# Patient Record
Sex: FEMALE | Race: WHITE
[De-identification: names, ages, dates, MRNs, and addresses within clinical notes are randomized per-mention and may not be internally consistent; named-entity substitution may affect disease eponyms.]

---

## 2021-12-10 ENCOUNTER — HOSPITAL ENCOUNTER (EMERGENCY)
Dept: HOSPITAL 12 - ER | Age: 31
Discharge: HOME | End: 2021-12-10
Payer: MEDICAID

## 2021-12-10 VITALS — HEIGHT: 68 IN | WEIGHT: 185 LBS | BODY MASS INDEX: 28.04 KG/M2

## 2021-12-10 VITALS — SYSTOLIC BLOOD PRESSURE: 103 MMHG | DIASTOLIC BLOOD PRESSURE: 66 MMHG

## 2021-12-10 DIAGNOSIS — F17.210: ICD-10-CM

## 2021-12-10 DIAGNOSIS — F15.10: ICD-10-CM

## 2021-12-10 DIAGNOSIS — L03.115: Primary | ICD-10-CM

## 2021-12-10 LAB
ALP SERPL-CCNC: 125 U/L (ref 50–136)
ALT SERPL W/O P-5'-P-CCNC: 31 U/L (ref 14–59)
AST SERPL-CCNC: 19 U/L (ref 15–37)
BILIRUB DIRECT SERPL-MCNC: 0.1 MG/DL (ref 0–0.2)
BILIRUB SERPL-MCNC: 0.4 MG/DL (ref 0.2–1)
BUN SERPL-MCNC: 13 MG/DL (ref 7–18)
CHLORIDE SERPL-SCNC: 98 MMOL/L (ref 98–107)
CO2 SERPL-SCNC: 30 MMOL/L (ref 21–32)
CREAT SERPL-MCNC: 0.6 MG/DL (ref 0.6–1.3)
GLUCOSE SERPL-MCNC: 107 MG/DL (ref 74–106)
HCT VFR BLD AUTO: 30.3 % (ref 31.2–41.9)
MCH RBC QN AUTO: 29.6 UUG (ref 24.7–32.8)
MCV RBC AUTO: 88.2 FL (ref 75.5–95.3)
PLATELET # BLD AUTO: 347 K/UL (ref 179–408)
POTASSIUM SERPL-SCNC: 3.6 MMOL/L (ref 3.5–5.1)
WS STN SPEC: 8.5 G/DL (ref 6.4–8.2)

## 2021-12-10 PROCEDURE — 84702 CHORIONIC GONADOTROPIN TEST: CPT

## 2021-12-10 PROCEDURE — 93971 EXTREMITY STUDY: CPT

## 2021-12-10 PROCEDURE — 80048 BASIC METABOLIC PNL TOTAL CA: CPT

## 2021-12-10 PROCEDURE — A4663 DIALYSIS BLOOD PRESSURE CUFF: HCPCS

## 2021-12-10 PROCEDURE — 85025 COMPLETE CBC W/AUTO DIFF WBC: CPT

## 2021-12-10 PROCEDURE — 80076 HEPATIC FUNCTION PANEL: CPT

## 2021-12-10 PROCEDURE — 83605 ASSAY OF LACTIC ACID: CPT

## 2021-12-10 PROCEDURE — 96365 THER/PROPH/DIAG IV INF INIT: CPT

## 2021-12-10 PROCEDURE — 99284 EMERGENCY DEPT VISIT MOD MDM: CPT

## 2021-12-10 PROCEDURE — 36415 COLL VENOUS BLD VENIPUNCTURE: CPT

## 2021-12-10 RX ADMIN — MORPHINE SULFATE ONE MG: 4 INJECTION, SOLUTION INTRAMUSCULAR; INTRAVENOUS at 06:34

## 2021-12-10 RX ADMIN — MORPHINE SULFATE ONE MG: 4 INJECTION, SOLUTION INTRAMUSCULAR; INTRAVENOUS at 07:48

## 2021-12-10 NOTE — NUR
Assumed care of patient from day shift nurse. Receieved patient lying in bed. 
Asleep at this time. Appears comfortable. VS WNL.

## 2021-12-10 NOTE — NUR
[employer] WAS NOTIFIED. HE IS ON HIS WAY BUT STUCK IN TRAFFIC. [facility]

## 2021-12-10 NOTE — NUR
Patient A/Ox4, patient ambulated with steady gait. Patient came for c/o lower 
extremity swelling and abscess in coccyx area with weeping. Patient reports IV 
drug abuse in the past but has recently stopped. 



Patient in bed at lowest position, sr upx2, call light within reach. Fall and 
safety precautions implemented per protocol.

## 2021-12-10 NOTE — NUR
Patient discharged to home in stable condition.  Written and verbal after care 
instructions given. 

Patient verbalizes understanding of instructions. Stressed follow up or return 
to ER for worsening s/s. Patient ambulated from the ER with steady gait. All 
belongings with patient.